# Patient Record
Sex: FEMALE
[De-identification: names, ages, dates, MRNs, and addresses within clinical notes are randomized per-mention and may not be internally consistent; named-entity substitution may affect disease eponyms.]

---

## 2024-05-21 PROBLEM — Z00.00 ENCOUNTER FOR PREVENTIVE HEALTH EXAMINATION: Status: ACTIVE | Noted: 2024-05-21

## 2024-05-22 ENCOUNTER — APPOINTMENT (OUTPATIENT)
Dept: UROLOGY | Facility: CLINIC | Age: 54
End: 2024-05-22
Payer: COMMERCIAL

## 2024-05-22 VITALS
TEMPERATURE: 36.2 F | WEIGHT: 140 LBS | SYSTOLIC BLOOD PRESSURE: 100 MMHG | HEART RATE: 68 BPM | OXYGEN SATURATION: 98 % | HEIGHT: 64 IN | BODY MASS INDEX: 23.9 KG/M2 | DIASTOLIC BLOOD PRESSURE: 68 MMHG

## 2024-05-22 DIAGNOSIS — R35.0 FREQUENCY OF MICTURITION: ICD-10-CM

## 2024-05-22 PROCEDURE — 99204 OFFICE O/P NEW MOD 45 MIN: CPT

## 2024-05-23 LAB
APPEARANCE: CLEAR
BACTERIA: NEGATIVE /HPF
BILIRUBIN URINE: NEGATIVE
BLOOD URINE: NEGATIVE
CAST: 0 /LPF
COLOR: YELLOW
EPITHELIAL CELLS: 4 /HPF
GLUCOSE QUALITATIVE U: NEGATIVE MG/DL
KETONES URINE: NEGATIVE MG/DL
LEUKOCYTE ESTERASE URINE: NEGATIVE
MICROSCOPIC-UA: NORMAL
NITRITE URINE: NEGATIVE
PH URINE: 6.5
PROTEIN URINE: NEGATIVE MG/DL
RED BLOOD CELLS URINE: 2 /HPF
SPECIFIC GRAVITY URINE: 1.01
UROBILINOGEN URINE: 0.2 MG/DL
WHITE BLOOD CELLS URINE: 0 /HPF

## 2024-05-23 RX ORDER — ESTRADIOL 0.1 MG/G
0.1 CREAM VAGINAL
Qty: 1 | Refills: 11 | Status: ACTIVE | COMMUNITY
Start: 2024-05-23 | End: 1900-01-01

## 2024-05-23 NOTE — ASSESSMENT
[FreeTextEntry1] : I discussed the findings and options with Ms. ROBERT BRUCE in detail.  The patient and I discussed what the pelvic floor is and what pelvic floor dysfunction is.   I explained that the pelvic floor is a group of muscles that links the "front" of your core (abs, obliques) to the "back" of your core (lats, erectors). It is a group of muscles that most people have poor awareness of. It includes (anteriorly) the superficial and deep transverse perineals, the ischiocavernosus, and bulbospongiosus. Posteriorly it includes the levators, pubococcygeus, iliococcygeus, coccygeus, and levators.  Because the urethra, vagina and rectum traverse these muscles, non-relaxation of the pelvic floor can cause vaginal, urinary and bowel issues. Additionally the bladder sits on top of the pelvic floor and can be affected by PF abnormalities. And because these muscles also have bony attachments, PFD can cause lower back pelvic, suprapubic and hip pain.  While many patients have PFD for unclear reasons, some patients have a h/o horseback riding or sexual abuse/assault. In many patients, the condition of PFD may precede symptoms by quite some time.  Treatment for PFD is multimodal. It almost always requires physical therapy and biofeedback. Intravaginal relaxing agents and trigger point injections as well as stress reduction and bowel regimens can be used as adjuncts. Treatment may take quite some time and requires patience: the patient must first learn the muscles affected and then work on relaxing them. There are multiple specialists that can be involved in this treatment.   - she will take anti-inflammatories PRN + apply heat to manage suprapubic pain. - consider PFPT. - estrace sent to pharmacy- this has helped in the past and will help prevent UTI's.  -  urine sent for UA UC. - if UAx returned positive for true microhematuria, she will need a cystoscopy as further testing.  If still bothered by intermittent bladder symptoms, return in 3-4 weeks for re-assessment.  All of her questions were answered. Patient expressed understanding.    The total amount of time I have personally spent preparing for this visit, reviewing the patient's test results, obtaining external history, ordering tests/medications, documenting clinical information, communicating with and counseling the patient/family and/or caregiver(s), reviewing old records, and spent face to face with the patient explaining the above was 45 minutes.

## 2024-05-23 NOTE — HISTORY OF PRESENT ILLNESS
[FreeTextEntry1] : 2024 -- 53 F  with UTI-like symptoms with no recent proven culture, intermittent bladder symptoms.  She saw a urogyn at  [facility] in the past- she cannot recall the name.  She was told to have "IC" in the past.   Admits to h/o 3-5 UTIs over the past year. Denies sex-linked UTIs. Reports mild persistent symptoms after abx.  She has tried vaginal hormonal inserts and it seemed to help her.  She stopped for unclear reasons.   She reports intermittent bladder symptoms- urinary frequency, urgency, burning/ "stinging" sensation post-micturition.  Reports urgency that is worse with fluid intake.   Sexually active with no pain. Samantha-menopause, (periods every other month).  PMH: denies  PSH: ortho- feet  FH: no  malignancies.  SocHx: non-smoker, social alcohol  Meds: n/a  Allergies: NKDA

## 2024-05-23 NOTE — ADDENDUM
[FreeTextEntry1] : A portion of this note was written by [Terell Wong] on 05/22/2024 acting as a scribe for Dr. Jones.   I have personally reviewed the chart and agree that the record accurately reflects my personal performance of the history, physical exam, assessment, and plan.

## 2024-05-24 LAB — BACTERIA UR CULT: NORMAL

## 2024-06-11 ENCOUNTER — APPOINTMENT (OUTPATIENT)
Dept: UROLOGY | Facility: CLINIC | Age: 54
End: 2024-06-11